# Patient Record
Sex: FEMALE | Race: ASIAN
[De-identification: names, ages, dates, MRNs, and addresses within clinical notes are randomized per-mention and may not be internally consistent; named-entity substitution may affect disease eponyms.]

---

## 2019-12-09 ENCOUNTER — HOSPITAL ENCOUNTER (EMERGENCY)
Dept: HOSPITAL 8 - ED | Age: 54
Discharge: HOME | End: 2019-12-09
Payer: COMMERCIAL

## 2019-12-09 VITALS — WEIGHT: 151.02 LBS | BODY MASS INDEX: 27.79 KG/M2 | HEIGHT: 62 IN

## 2019-12-09 VITALS — SYSTOLIC BLOOD PRESSURE: 143 MMHG | DIASTOLIC BLOOD PRESSURE: 75 MMHG

## 2019-12-09 DIAGNOSIS — E11.9: ICD-10-CM

## 2019-12-09 DIAGNOSIS — R42: Primary | ICD-10-CM

## 2019-12-09 DIAGNOSIS — R51: ICD-10-CM

## 2019-12-09 LAB
ALBUMIN SERPL-MCNC: 3.6 G/DL (ref 3.4–5)
ANION GAP SERPL CALC-SCNC: 8 MMOL/L (ref 5–15)
BASOPHILS # BLD AUTO: 0.04 X10^3/UL (ref 0–0.1)
BASOPHILS NFR BLD AUTO: 1 % (ref 0–1)
CALCIUM SERPL-MCNC: 9.7 MG/DL (ref 8.5–10.1)
CHLORIDE SERPL-SCNC: 110 MMOL/L (ref 98–107)
CREAT SERPL-MCNC: 7.32 MG/DL (ref 0.55–1.02)
EOSINOPHIL # BLD AUTO: 0.44 X10^3/UL (ref 0–0.4)
EOSINOPHIL NFR BLD AUTO: 7 % (ref 1–7)
ERYTHROCYTE [DISTWIDTH] IN BLOOD BY AUTOMATED COUNT: 12.7 % (ref 9.6–15.2)
LYMPHOCYTES # BLD AUTO: 1.25 X10^3/UL (ref 1–3.4)
LYMPHOCYTES NFR BLD AUTO: 20 % (ref 22–44)
MCH RBC QN AUTO: 32.4 PG (ref 27–34.8)
MCHC RBC AUTO-ENTMCNC: 33.6 G/DL (ref 32.4–35.8)
MCV RBC AUTO: 96.4 FL (ref 80–100)
MD: NO
MONOCYTES # BLD AUTO: 0.39 X10^3/UL (ref 0.2–0.8)
MONOCYTES NFR BLD AUTO: 6 % (ref 2–9)
NEUTROPHILS # BLD AUTO: 4.13 X10^3/UL (ref 1.8–6.8)
NEUTROPHILS NFR BLD AUTO: 66 % (ref 42–75)
PLATELET # BLD AUTO: 187 X10^3/UL (ref 130–400)
PMV BLD AUTO: 7.4 FL (ref 7.4–10.4)
RBC # BLD AUTO: 3.57 X10^6/UL (ref 3.82–5.3)
TROPONIN I SERPL-MCNC: < 0.015 NG/ML (ref 0–0.04)

## 2019-12-09 PROCEDURE — 96375 TX/PRO/DX INJ NEW DRUG ADDON: CPT

## 2019-12-09 PROCEDURE — 96374 THER/PROPH/DIAG INJ IV PUSH: CPT

## 2019-12-09 PROCEDURE — 71045 X-RAY EXAM CHEST 1 VIEW: CPT

## 2019-12-09 PROCEDURE — 70450 CT HEAD/BRAIN W/O DYE: CPT

## 2019-12-09 PROCEDURE — 84484 ASSAY OF TROPONIN QUANT: CPT

## 2019-12-09 PROCEDURE — 82040 ASSAY OF SERUM ALBUMIN: CPT

## 2019-12-09 PROCEDURE — 80048 BASIC METABOLIC PNL TOTAL CA: CPT

## 2019-12-09 PROCEDURE — 83880 ASSAY OF NATRIURETIC PEPTIDE: CPT

## 2019-12-09 PROCEDURE — 85025 COMPLETE CBC W/AUTO DIFF WBC: CPT

## 2019-12-09 PROCEDURE — 36415 COLL VENOUS BLD VENIPUNCTURE: CPT

## 2019-12-09 PROCEDURE — 99284 EMERGENCY DEPT VISIT MOD MDM: CPT

## 2019-12-09 PROCEDURE — 93005 ELECTROCARDIOGRAM TRACING: CPT

## 2019-12-09 NOTE — NUR
Patient continues to have complaints of dizziness.  RN administered about 2.5 
mg IV valium before the patient started to complain of pain at the site.  No 
erythema or edema at IV site.  TEJINDER Alfaro aware.  Patient on pulse ox and NIBP.

## 2019-12-09 NOTE — NUR
PT PRESENTS TO ED WITH DIZZINESS STARTING FRIDAY. PT DENIES CP, N/V/D. PT 
REPORTS LAST DIALYSIS FRIDAY, AND DUE TODAY FOR MORE. IV START. XRAY AT 
BEDSIDE. PT DIZZINESS STOPS WHEN PT IS STILL, INCREASES WITH HEAD TURNING. 
DAUGHTER AT BEDSIDE. SIDE RAILS UP, CALL LIGHT IN REACH. BLANKET IN PLACE. EKG 
COMPLETED.

## 2019-12-09 NOTE — NUR
Patient reports decreased dizziness.  Patient/Caregiver given discharge 
instructions and they have confirmed that they understand the instructions.  
Patient ambulatory with steady gait.